# Patient Record
Sex: MALE | Race: WHITE | Employment: OTHER | URBAN - METROPOLITAN AREA
[De-identification: names, ages, dates, MRNs, and addresses within clinical notes are randomized per-mention and may not be internally consistent; named-entity substitution may affect disease eponyms.]

---

## 2020-07-28 ENCOUNTER — NEW PATIENT (OUTPATIENT)
Dept: URBAN - METROPOLITAN AREA CLINIC 26 | Facility: CLINIC | Age: 75
End: 2020-07-28

## 2020-07-28 VITALS
DIASTOLIC BLOOD PRESSURE: 91 MMHG | BODY MASS INDEX: 30.31 KG/M2 | WEIGHT: 200 LBS | SYSTOLIC BLOOD PRESSURE: 133 MMHG | HEART RATE: 92 BPM | HEIGHT: 68 IN

## 2020-07-28 DIAGNOSIS — H53.2: ICD-10-CM

## 2020-07-28 DIAGNOSIS — H57.11: ICD-10-CM

## 2020-07-28 DIAGNOSIS — G35: ICD-10-CM

## 2020-07-28 DIAGNOSIS — H43.393: ICD-10-CM

## 2020-07-28 DIAGNOSIS — H35.3131: ICD-10-CM

## 2020-07-28 PROCEDURE — 92004 COMPRE OPH EXAM NEW PT 1/>: CPT

## 2020-07-28 PROCEDURE — 92250 FUNDUS PHOTOGRAPHY W/I&R: CPT

## 2020-07-28 PROCEDURE — 92235 FLUORESCEIN ANGRPH MLTIFRAME: CPT

## 2020-07-28 RX ORDER — PREDNISONE 20MG 20 MG/1: 3 TABLET ORAL

## 2020-07-28 ASSESSMENT — TONOMETRY
OD_IOP_MMHG: 13
OS_IOP_MMHG: 13

## 2020-07-28 ASSESSMENT — VISUAL ACUITY
OS_SC: 20/25
OD_SC: 20/20

## 2020-08-03 ENCOUNTER — ADDENDUM (OUTPATIENT)
Dept: URBAN - METROPOLITAN AREA CLINIC 26 | Facility: CLINIC | Age: 75
End: 2020-08-03

## 2020-08-13 ENCOUNTER — ADDENDUM (OUTPATIENT)
Dept: URBAN - METROPOLITAN AREA CLINIC 26 | Facility: CLINIC | Age: 75
End: 2020-08-13

## 2022-07-05 ENCOUNTER — WEB ENCOUNTER (OUTPATIENT)
Dept: URBAN - METROPOLITAN AREA CLINIC 60 | Facility: CLINIC | Age: 77
End: 2022-07-05

## 2022-07-08 ENCOUNTER — OFFICE VISIT (OUTPATIENT)
Dept: URBAN - METROPOLITAN AREA CLINIC 60 | Facility: CLINIC | Age: 77
End: 2022-07-08
Payer: MEDICARE

## 2022-07-08 ENCOUNTER — WEB ENCOUNTER (OUTPATIENT)
Dept: URBAN - METROPOLITAN AREA CLINIC 60 | Facility: CLINIC | Age: 77
End: 2022-07-08

## 2022-07-08 ENCOUNTER — LAB OUTSIDE AN ENCOUNTER (OUTPATIENT)
Dept: URBAN - METROPOLITAN AREA CLINIC 60 | Facility: CLINIC | Age: 77
End: 2022-07-08

## 2022-07-08 VITALS
TEMPERATURE: 98.7 F | RESPIRATION RATE: 12 BRPM | HEART RATE: 66 BPM | OXYGEN SATURATION: 98 % | SYSTOLIC BLOOD PRESSURE: 140 MMHG | HEIGHT: 68 IN | WEIGHT: 200.4 LBS | DIASTOLIC BLOOD PRESSURE: 72 MMHG | BODY MASS INDEX: 30.37 KG/M2

## 2022-07-08 DIAGNOSIS — Z86.010 PERSONAL HISTORY OF COLONIC POLYPS: ICD-10-CM

## 2022-07-08 DIAGNOSIS — R05.3 CHRONIC COUGH: ICD-10-CM

## 2022-07-08 DIAGNOSIS — R19.7 DIARRHEA, UNSPECIFIED TYPE: ICD-10-CM

## 2022-07-08 PROCEDURE — 99204 OFFICE O/P NEW MOD 45 MIN: CPT | Performed by: PHYSICIAN ASSISTANT

## 2022-07-08 RX ORDER — LITHIUM CARBONATE 300 MG/1
1 CAPSULE AT BEDTIME CAPSULE, GELATIN COATED ORAL ONCE A DAY
Status: ACTIVE | COMMUNITY

## 2022-07-08 RX ORDER — OMEPRAZOLE 20 MG/1
1 CAPSULE 30 MINUTES BEFORE MORNING MEAL CAPSULE, DELAYED RELEASE ORAL ONCE A DAY
Status: ACTIVE | COMMUNITY

## 2022-07-08 RX ORDER — MECOBALAMIN 5000 MCG
AS DIRECTED LOZENGE ORAL
Status: ACTIVE | COMMUNITY

## 2022-07-08 RX ORDER — LOSARTAN POTASSIUM 25 MG/1
1 TABLET TABLET ORAL ONCE A DAY
Status: ACTIVE | COMMUNITY

## 2022-07-08 RX ORDER — ESCITALOPRAM OXALATE 10 MG/1
1 TABLET TABLET ORAL ONCE A DAY
Status: ACTIVE | COMMUNITY

## 2022-07-08 RX ORDER — TAMSULOSIN HYDROCHLORIDE 0.4 MG/1
1 CAPSULE CAPSULE ORAL ONCE A DAY
Status: ACTIVE | COMMUNITY

## 2022-07-08 NOTE — HPI-TODAY'S VISIT:
77-year-old male with history of hyperparathyroidism status post thyroidectomy in October 2021, bipolar depression, MS, hypertension presents to the office for evaluation of GERD symptoms and a change in bowel habits.  Labs done on July 1 demonstrated an unremarkable CBC, CMP, TSH.  He has a history of vitamin B12 deficiency, on supplementation.  Recent vitamin B12 level was normal.  Parietal cell antibody was negative in April 2022.  He presents to the office today to discuss a couple of issues.  First he reports having a chronic cough which began at the beginning of 2022.  The cough is nonproductive.  Cough occurs intermittently throughout the day.  He is not having any shortness of breath or chest pain.  He has a longstanding history of GERD.  He has been on omeprazole for 20 years.  Last EGD was done 7 to 8 years ago by Dr. Ayoub.  No report available.  He states his EGD was unremarkable at that time.  He denies dysphagia, odynophagia, pyrosis.  He has been eating well.  He reports a weight loss of about 10 pounds over the past year.  He also reports having episodes of urgent, explosive diarrhea.  This is happened about 12 times over the past year or 2.  He states this will only occur if he eats breakfast in a restaurant.  The restaurants will vary but he will usually order eggs, hashbrowns, be/sausage, toast and he will have 2 to 3 cups of coffee.  If he does not eat out at a restaurant, he usually does not have any diarrhea.  When he has diarrhea he will have 1 episode and then he feels fine.  When he eats breakfast at home, he will have eggs but no hashbrowns or breakfast meat.  His bowel habits are otherwise regular.  He usually has 1 formed stool per day with no melena or hematochezia.  He has no abdominal pain. His last colonoscopy was done 7 or 8 years ago by Dr. Ayoub.  He reports a personal history of colon polyps.  He does not use NSAIDs.   He has no family history of celiac disease, IBD or GI malignancy.

## 2022-07-08 NOTE — PHYSICAL EXAM SKIN:
no rashes , no suspicious lesions , no areas of discoloration , no jaundice present , no masses , no tenderness on palpation

## 2022-07-08 NOTE — PHYSICAL EXAM CHEST:
no lesions,  no deformities,  no traumatic injuries,   no masses present, breathing is unlabored without accessory muscle use, normal breath sounds

## 2022-07-09 ENCOUNTER — TELEPHONE ENCOUNTER (OUTPATIENT)
Dept: URBAN - METROPOLITAN AREA CLINIC 121 | Facility: CLINIC | Age: 77
End: 2022-07-09

## 2022-07-10 ENCOUNTER — TELEPHONE ENCOUNTER (OUTPATIENT)
Dept: URBAN - METROPOLITAN AREA CLINIC 121 | Facility: CLINIC | Age: 77
End: 2022-07-10

## 2022-07-30 ENCOUNTER — TELEPHONE ENCOUNTER (OUTPATIENT)
Age: 77
End: 2022-07-30

## 2022-07-31 ENCOUNTER — TELEPHONE ENCOUNTER (OUTPATIENT)
Age: 77
End: 2022-07-31

## 2022-10-21 ENCOUNTER — TELEPHONE ENCOUNTER (OUTPATIENT)
Dept: URBAN - METROPOLITAN AREA CLINIC 60 | Facility: CLINIC | Age: 77
End: 2022-10-21

## 2022-10-24 ENCOUNTER — OFFICE VISIT (OUTPATIENT)
Dept: URBAN - METROPOLITAN AREA MEDICAL CENTER 3 | Facility: MEDICAL CENTER | Age: 77
End: 2022-10-24
Payer: MEDICARE

## 2022-10-24 DIAGNOSIS — R05.3 CHRONIC COUGH: ICD-10-CM

## 2022-10-24 DIAGNOSIS — R12 HEARTBURN: ICD-10-CM

## 2022-10-24 DIAGNOSIS — D12.4 POLYP OF DESCENDING COLON: ICD-10-CM

## 2022-10-24 DIAGNOSIS — R19.7 DIARRHEA, UNSPECIFIED TYPE: ICD-10-CM

## 2022-10-24 DIAGNOSIS — K57.30 DIVERTCULOSIS OF LG INT W/O PERFORATION OR ABSCESS W/O BLEEDING: ICD-10-CM

## 2022-10-24 DIAGNOSIS — K22.9 IRREGULAR Z LINE OF ESOPHAGUS: ICD-10-CM

## 2022-10-24 DIAGNOSIS — D12.3 POLYP OF TRANSVERSE COLON: ICD-10-CM

## 2022-10-24 DIAGNOSIS — Z86.010 PERSONAL HISTORY OF COLONIC POLYPS: ICD-10-CM

## 2022-10-24 PROCEDURE — 45380 COLONOSCOPY AND BIOPSY: CPT | Performed by: INTERNAL MEDICINE

## 2022-10-24 PROCEDURE — 45385 COLONOSCOPY W/LESION REMOVAL: CPT | Performed by: INTERNAL MEDICINE

## 2022-10-24 PROCEDURE — 43239 EGD BIOPSY SINGLE/MULTIPLE: CPT | Performed by: INTERNAL MEDICINE

## 2022-11-03 PROBLEM — 398050005 DIVERTICULAR DISEASE OF COLON: Status: ACTIVE | Noted: 2022-11-03

## 2022-11-09 ENCOUNTER — DASHBOARD ENCOUNTERS (OUTPATIENT)
Age: 77
End: 2022-11-09

## 2022-11-09 ENCOUNTER — OFFICE VISIT (OUTPATIENT)
Dept: URBAN - METROPOLITAN AREA CLINIC 60 | Facility: CLINIC | Age: 77
End: 2022-11-09
Payer: MEDICARE

## 2022-11-09 VITALS
HEIGHT: 68 IN | WEIGHT: 189.8 LBS | BODY MASS INDEX: 28.76 KG/M2 | TEMPERATURE: 98 F | DIASTOLIC BLOOD PRESSURE: 68 MMHG | OXYGEN SATURATION: 96 % | SYSTOLIC BLOOD PRESSURE: 126 MMHG | RESPIRATION RATE: 12 BRPM | HEART RATE: 55 BPM

## 2022-11-09 DIAGNOSIS — K64.0 GRADE I HEMORRHOIDS: ICD-10-CM

## 2022-11-09 DIAGNOSIS — K21.00 GASTROESOPHAGEAL REFLUX DISEASE WITH ESOPHAGITIS WITHOUT HEMORRHAGE: ICD-10-CM

## 2022-11-09 DIAGNOSIS — K57.90 DIVERTICULOSIS: ICD-10-CM

## 2022-11-09 DIAGNOSIS — K29.70 GASTRITIS WITHOUT BLEEDING, UNSPECIFIED CHRONICITY, UNSPECIFIED GASTRITIS TYPE: ICD-10-CM

## 2022-11-09 DIAGNOSIS — Z86.010 PERSONAL HISTORY OF COLONIC POLYPS: ICD-10-CM

## 2022-11-09 PROBLEM — 4556007: Status: ACTIVE | Noted: 2022-11-09

## 2022-11-09 PROBLEM — 397881000: Status: ACTIVE | Noted: 2022-11-09

## 2022-11-09 PROBLEM — 266433003: Status: ACTIVE | Noted: 2022-11-09

## 2022-11-09 PROCEDURE — 99213 OFFICE O/P EST LOW 20 MIN: CPT | Performed by: PHYSICIAN ASSISTANT

## 2022-11-09 RX ORDER — LITHIUM CARBONATE 300 MG/1
1 CAPSULE AT BEDTIME CAPSULE, GELATIN COATED ORAL ONCE A DAY
Status: ACTIVE | COMMUNITY

## 2022-11-09 RX ORDER — ESCITALOPRAM OXALATE 10 MG/1
1 TABLET TABLET ORAL ONCE A DAY
Status: ACTIVE | COMMUNITY

## 2022-11-09 RX ORDER — TAMSULOSIN HYDROCHLORIDE 0.4 MG/1
1 CAPSULE CAPSULE ORAL ONCE A DAY
Status: ACTIVE | COMMUNITY

## 2022-11-09 RX ORDER — OMEPRAZOLE 20 MG/1
1 CAPSULE 30 MINUTES BEFORE  MEALS CAPSULE, DELAYED RELEASE ORAL TWICE A DAY
Qty: 180 | Refills: 3 | OUTPATIENT
Start: 2022-11-09

## 2022-11-09 RX ORDER — MECOBALAMIN 5000 MCG
AS DIRECTED LOZENGE ORAL
Status: ACTIVE | COMMUNITY

## 2022-11-09 RX ORDER — LOSARTAN POTASSIUM 25 MG/1
1 TABLET TABLET ORAL ONCE A DAY
Status: ACTIVE | COMMUNITY

## 2022-11-09 RX ORDER — OMEPRAZOLE 20 MG/1
1 CAPSULE 30 MINUTES BEFORE MORNING MEAL CAPSULE, DELAYED RELEASE ORAL ONCE A DAY
Status: ACTIVE | COMMUNITY

## 2022-11-09 NOTE — HPI-TODAY'S VISIT:
77-year-old male presents for follow-up.  He presented to the office in July with reports of intermittent bouts of urgent and explosive diarrhea which would only occur when he would go out for breakfast.  His bowel habits were otherwise regular.  He also reported a chronic cough, nonproductive since the beginning of 2022.  He denied any heartburn, dysphagia, odynophagia.  He had been on omeprazole for at least 20 years for longstanding history of GERD. Labs were done including CMP, celiac panel, CRP which were unremarkable.  His CBC was normal in July 2022 prior to his initial office visit.  Stool studies were done and were negative for pathogens.  Fecal calprotectin 97. EGD and colonoscopy were done on October 24, 2022.  His EGD demonstrated an irregular Z-line at the GE junction.  The biopsy was negative for intestinal metaplasia.  Mild erythema was found in the antrum and body of the stomach.  Gastric biopsy negative for H. pylori.  Duodenitis with mild erythema and mucosal edema was noted in the duodenal sweep.  His colonoscopy demonstrated 3 small tubular adenomas which were removed from the transverse colon.  2 small tubular adenomas were removed from the descending colon.  Additional findings included left colonic diverticulosis and small internal hemorrhoids.  Random colon biopsy was normal.  Repeat colonoscopy was recommended in 3 years for surveillance if appropriate at the time given his advanced age.  He follows up doing well. He had no problems following his procedures. He has not had any diarrhea since his last office visit. States he found that he does not tolerate Syrian vanilla coffee creamer. He has no GI complaints today. He is requesting refills of omeprazole.   He has no family history of any GI problems or cancers.

## 2023-11-27 ENCOUNTER — ERX REFILL RESPONSE (OUTPATIENT)
Dept: URBAN - METROPOLITAN AREA CLINIC 60 | Facility: CLINIC | Age: 78
End: 2023-11-27

## 2023-11-27 RX ORDER — OMEPRAZOLE 20 MG/1
TAKE ONE CAPSULE BY MOUTH TWICE A DAY 30 MINUTES BEFORE A MEAL CAPSULE, DELAYED RELEASE ORAL
Qty: 180 CAPSULE | Refills: 3 | OUTPATIENT

## 2023-11-27 RX ORDER — OMEPRAZOLE 20 MG/1
1 CAPSULE 30 MINUTES BEFORE  MEALS CAPSULE, DELAYED RELEASE ORAL TWICE A DAY
Qty: 180 | Refills: 3 | OUTPATIENT

## 2023-12-01 ENCOUNTER — TELEPHONE ENCOUNTER (OUTPATIENT)
Dept: URBAN - METROPOLITAN AREA CLINIC 63 | Facility: CLINIC | Age: 78
End: 2023-12-01